# Patient Record
Sex: FEMALE | Race: WHITE | NOT HISPANIC OR LATINO
[De-identification: names, ages, dates, MRNs, and addresses within clinical notes are randomized per-mention and may not be internally consistent; named-entity substitution may affect disease eponyms.]

---

## 2022-07-30 ENCOUNTER — TELEPHONE ENCOUNTER (OUTPATIENT)
Age: 62
End: 2022-07-30

## 2022-07-30 RX ORDER — PANTOPRAZOLE SODIUM 40 MG/1
1 TABLET DR TABLET, DELAYED RELEASE ORAL
Qty: 0 | Refills: 16 | OUTPATIENT
Start: 2013-07-26 | End: 2016-03-15

## 2022-07-30 RX ORDER — LIDOCAINE HYDROCHLORIDE 30 MG/G
1 (ONE) CREAM TOPICAL
Qty: 0 | Refills: 2 | OUTPATIENT
Start: 2011-04-20 | End: 2011-04-30

## 2022-07-30 RX ORDER — FAMOTIDINE 10 MG
1 (ONE) TABLET ORAL AT BEDTIME
Qty: 0 | Refills: 16 | OUTPATIENT
Start: 2014-05-02 | End: 2016-03-15

## 2022-07-30 RX ORDER — ALUMINUM HYDROXIDE AND MAGNESIUM CARBONATE 95; 358 MG/15ML; MG/15ML
15ML LIQUID ORAL
Qty: 0 | Refills: 2 | OUTPATIENT
Start: 2014-05-02 | End: 2014-06-01

## 2022-07-30 RX ORDER — LORATADINE 5 MG
17 GRAMS POWDER TABLET,CHEWABLE ORAL
Qty: 0 | Refills: 16 | OUTPATIENT
Start: 2013-07-19 | End: 2014-05-02

## 2022-07-30 RX ORDER — HYDROCORTISONE ACETATE 25 MG/1
1 (ONE) SUPPOSITORY RECTAL
Qty: 0 | Refills: 3 | OUTPATIENT
Start: 2011-04-20 | End: 2013-07-19

## 2022-07-31 ENCOUNTER — TELEPHONE ENCOUNTER (OUTPATIENT)
Age: 62
End: 2022-07-31

## 2022-07-31 RX ORDER — FAMOTIDINE 10 MG
1 (ONE) TABLET ORAL AT BEDTIME
Qty: 0 | Refills: 16 | Status: ACTIVE | COMMUNITY
Start: 2014-05-02

## 2022-07-31 RX ORDER — LORATADINE 5 MG
17 GRAMS TABLET,CHEWABLE ORAL
Qty: 0 | Refills: 16 | Status: ACTIVE | COMMUNITY
Start: 2013-07-19

## 2022-07-31 RX ORDER — LIDOCAINE HYDROCHLORIDE 30 MG/G
1 (ONE) CREAM TOPICAL
Qty: 0 | Refills: 2 | Status: ACTIVE | COMMUNITY
Start: 2011-04-20

## 2022-07-31 RX ORDER — HYDROCORTISONE ACETATE 25 MG/1
1 (ONE) SUPPOSITORY RECTAL
Qty: 0 | Refills: 3 | Status: ACTIVE | COMMUNITY
Start: 2011-04-20

## 2022-07-31 RX ORDER — ALUMINUM HYDROXIDE AND MAGNESIUM CARBONATE 95; 358 MG/15ML; MG/15ML
15ML LIQUID ORAL
Qty: 0 | Refills: 2 | Status: ACTIVE | COMMUNITY
Start: 2014-05-02

## 2022-07-31 RX ORDER — PANTOPRAZOLE SODIUM 40 MG/1
1 TABLET, DELAYED RELEASE ORAL
Qty: 0 | Refills: 16 | Status: ACTIVE | COMMUNITY
Start: 2013-07-26

## 2023-05-26 ENCOUNTER — TELEPHONE ENCOUNTER (OUTPATIENT)
Dept: URBAN - METROPOLITAN AREA SURGERY CENTER 5 | Facility: SURGERY CENTER | Age: 63
End: 2023-05-26

## 2023-05-26 VITALS — HEIGHT: 63 IN | BODY MASS INDEX: 24.8 KG/M2 | WEIGHT: 140 LBS

## 2023-05-26 RX ORDER — AMLODIPINE BESYLATE 2.5 MG/1
1 TABLET TABLET ORAL ONCE A DAY
Status: ACTIVE | COMMUNITY

## 2023-05-26 RX ORDER — LIDOCAINE HYDROCHLORIDE 30 MG/G
1 (ONE) CREAM TOPICAL
Qty: 0 | Refills: 2 | Status: DISCONTINUED | COMMUNITY
Start: 2011-04-20

## 2023-05-26 RX ORDER — UBIQUINOL 100 MG
AS DIRECTED CAPSULE ORAL ONCE A DAY
Status: ACTIVE | COMMUNITY

## 2023-05-26 RX ORDER — ALUMINUM HYDROXIDE AND MAGNESIUM CARBONATE 95; 358 MG/15ML; MG/15ML
15ML LIQUID ORAL
Qty: 0 | Refills: 2 | Status: ACTIVE | COMMUNITY
Start: 2014-05-02

## 2023-05-26 RX ORDER — ZINC GLUCONATE 50 MG
1 TABLET TABLET ORAL ONCE A DAY
Status: ACTIVE | COMMUNITY

## 2023-05-26 RX ORDER — PANTOPRAZOLE SODIUM 40 MG/1
1 TABLET, DELAYED RELEASE ORAL
Qty: 0 | Refills: 16 | Status: DISCONTINUED | COMMUNITY
Start: 2013-07-26

## 2023-05-26 RX ORDER — DICLOFENAC SODIUM 10 MG/G
AS DIRECTED GEL TOPICAL
Status: ACTIVE | COMMUNITY

## 2023-05-26 RX ORDER — LORATADINE 5 MG
17 GRAMS TABLET,CHEWABLE ORAL
Qty: 0 | Refills: 16 | Status: DISCONTINUED | COMMUNITY
Start: 2013-07-19

## 2023-05-26 RX ORDER — HYDROCORTISONE ACETATE 25 MG/1
1 (ONE) SUPPOSITORY RECTAL
Qty: 0 | Refills: 3 | Status: DISCONTINUED | COMMUNITY
Start: 2011-04-20

## 2023-05-26 RX ORDER — UBIDECARENONE 30 MG
AS DIRECTED CAPSULE ORAL
Status: ACTIVE | COMMUNITY

## 2023-05-26 RX ORDER — FAMOTIDINE 10 MG
1 (ONE) TABLET TABLET ORAL AS NEEDED
Refills: 16 | Status: ACTIVE | COMMUNITY
Start: 2014-05-02

## 2023-06-02 ENCOUNTER — LAB OUTSIDE AN ENCOUNTER (OUTPATIENT)
Dept: URBAN - METROPOLITAN AREA SURGERY CENTER 5 | Facility: SURGERY CENTER | Age: 63
End: 2023-06-02

## 2023-08-11 ENCOUNTER — TELEPHONE ENCOUNTER (OUTPATIENT)
Dept: URBAN - METROPOLITAN AREA CLINIC 6 | Facility: CLINIC | Age: 63
End: 2023-08-11

## 2023-08-18 ENCOUNTER — CLAIMS CREATED FROM THE CLAIM WINDOW (OUTPATIENT)
Dept: URBAN - METROPOLITAN AREA CLINIC 4 | Facility: CLINIC | Age: 63
End: 2023-08-18
Payer: COMMERCIAL

## 2023-08-18 ENCOUNTER — OFFICE VISIT (OUTPATIENT)
Dept: URBAN - METROPOLITAN AREA SURGERY CENTER 5 | Facility: SURGERY CENTER | Age: 63
End: 2023-08-18
Payer: COMMERCIAL

## 2023-08-18 DIAGNOSIS — D12.4 POLYP OF DESCENDING COLON: ICD-10-CM

## 2023-08-18 DIAGNOSIS — Z12.11 ENCOUNTER FOR SCREENING FOR MALIGNANT NEOPLASM OF COLON: ICD-10-CM

## 2023-08-18 DIAGNOSIS — K64.1 SECOND DEGREE HEMORRHOIDS: ICD-10-CM

## 2023-08-18 DIAGNOSIS — K63.89 OTHER SPECIFIED DISEASES OF INTESTINE: ICD-10-CM

## 2023-08-18 DIAGNOSIS — K57.30 DIVERTICULOSIS OF LARGE INTESTINE WITHOUT PERFORATION OR ABSCESS WITHOUT BLEEDING: ICD-10-CM

## 2023-08-18 DIAGNOSIS — K63.5 POLYP OF ASCENDING COLON, UNSPECIFIED TYPE: ICD-10-CM

## 2023-08-18 DIAGNOSIS — D12.2 POLYP OF ASCENDING COLON: ICD-10-CM

## 2023-08-18 PROBLEM — 724538004: Status: ACTIVE | Noted: 2023-08-18

## 2023-08-18 PROCEDURE — 45385 COLONOSCOPY W/LESION REMOVAL: CPT | Performed by: INTERNAL MEDICINE

## 2023-08-18 PROCEDURE — 00811 ANES LWR INTST NDSC NOS: CPT | Performed by: NURSE ANESTHETIST, CERTIFIED REGISTERED

## 2023-08-18 PROCEDURE — 88305 TISSUE EXAM BY PATHOLOGIST: CPT | Performed by: PATHOLOGY

## 2024-04-12 ENCOUNTER — OV EP (OUTPATIENT)
Dept: URBAN - METROPOLITAN AREA CLINIC 7 | Facility: CLINIC | Age: 64
End: 2024-04-12
Payer: COMMERCIAL

## 2024-04-12 VITALS
SYSTOLIC BLOOD PRESSURE: 134 MMHG | HEIGHT: 63 IN | WEIGHT: 146 LBS | TEMPERATURE: 97.8 F | BODY MASS INDEX: 25.87 KG/M2 | DIASTOLIC BLOOD PRESSURE: 72 MMHG

## 2024-04-12 DIAGNOSIS — K59.09 INTERMITTENT CONSTIPATION: ICD-10-CM

## 2024-04-12 DIAGNOSIS — R11.0 NAUSEA: ICD-10-CM

## 2024-04-12 DIAGNOSIS — Z86.010 HX OF COLONIC POLYPS: ICD-10-CM

## 2024-04-12 DIAGNOSIS — K57.92 ACUTE DIVERTICULITIS: ICD-10-CM

## 2024-04-12 DIAGNOSIS — F41.9 ANXIETY: ICD-10-CM

## 2024-04-12 DIAGNOSIS — Z80.0 FAMILY HX OF COLON CANCER: ICD-10-CM

## 2024-04-12 DIAGNOSIS — K21.9 GERD WITHOUT ESOPHAGITIS: ICD-10-CM

## 2024-04-12 PROBLEM — 735593008: Status: ACTIVE | Noted: 2024-04-12

## 2024-04-12 PROBLEM — 14760008: Status: ACTIVE | Noted: 2024-04-12

## 2024-04-12 PROBLEM — 422587007: Status: ACTIVE | Noted: 2024-04-12

## 2024-04-12 PROBLEM — 428283002: Status: ACTIVE | Noted: 2024-04-12

## 2024-04-12 PROBLEM — 266435005: Status: ACTIVE | Noted: 2024-04-12

## 2024-04-12 PROBLEM — 312824007: Status: ACTIVE | Noted: 2024-04-12

## 2024-04-12 PROCEDURE — 99214 OFFICE O/P EST MOD 30 MIN: CPT | Performed by: INTERNAL MEDICINE

## 2024-04-12 RX ORDER — UBIDECARENONE 30 MG
AS DIRECTED CAPSULE ORAL
Status: ON HOLD | COMMUNITY

## 2024-04-12 RX ORDER — FAMOTIDINE 10 MG
1 (ONE) TABLET TABLET ORAL AS NEEDED
Refills: 16 | Status: ON HOLD | COMMUNITY
Start: 2014-05-02

## 2024-04-12 RX ORDER — AMLODIPINE BESYLATE 2.5 MG/1
1 TABLET TABLET ORAL ONCE A DAY
Status: ACTIVE | COMMUNITY

## 2024-04-12 RX ORDER — FAMOTIDINE 20 MG/1
TAKE 1 TABLET BY MOUTH DAILY TABLET, FILM COATED ORAL ONCE A DAY
Qty: 30 | Refills: 11

## 2024-04-12 RX ORDER — FAMOTIDINE 20 MG/1
TAKE 1 TABLET BY MOUTH DAILY TABLET, FILM COATED ORAL
Qty: 20 EACH | Refills: 0 | Status: ACTIVE | COMMUNITY

## 2024-04-12 RX ORDER — UBIQUINOL 100 MG
AS DIRECTED CAPSULE ORAL ONCE A DAY
Status: ACTIVE | COMMUNITY

## 2024-04-12 RX ORDER — ONDANSETRON 4 MG/1
DISSOLVE 1 TABLET ON THE TONGUE EVERY 8 HOURS FOR UP TO 9 DOSES AS NEEDED FOR NAUSEA TABLET, ORALLY DISINTEGRATING ORAL
Qty: 9 EACH | Refills: 0 | Status: ACTIVE | COMMUNITY

## 2024-04-12 RX ORDER — ONDANSETRON 4 MG/1
1 TABLE TABLET, ORALLY DISINTEGRATING ORAL
Qty: 90 | Refills: 1

## 2024-04-12 RX ORDER — ATORVASTATIN CALCIUM 20 MG/1
5 ML TABLET, FILM COATED ORAL ONCE A DAY
Status: ACTIVE | COMMUNITY

## 2024-04-12 RX ORDER — ZINC GLUCONATE 50 MG
1 TABLET TABLET ORAL ONCE A DAY
Status: ON HOLD | COMMUNITY

## 2024-04-12 RX ORDER — ALUMINUM HYDROXIDE AND MAGNESIUM CARBONATE 95; 358 MG/15ML; MG/15ML
15ML LIQUID ORAL
Qty: 0 | Refills: 2 | Status: ACTIVE | COMMUNITY
Start: 2014-05-02

## 2024-04-12 RX ORDER — DICLOFENAC SODIUM 10 MG/G
AS DIRECTED GEL TOPICAL
Status: ACTIVE | COMMUNITY

## 2024-04-12 NOTE — HPI-TODAY'S VISIT:
Patient is seen today after a prior  imaging study in ER with acute diverticulitis after presenting with abd pain and a change in bowel habits. Completed course antibiotic rx. Takes prn zofran for nausea Daily famotidine Sigmoid diverticulosis noted on colonoscopy 2023. No current gerd alarm sxs. No current abdominal  pain. No fevers or chills. No night sweats. No weight loss. No nausea or vomiting. No dark urine or acholic stools. No change in bowel habits . No travel,sick contacts or new medications. No family history IBD,CRC

## 2024-08-14 ENCOUNTER — TELEPHONE ENCOUNTER (OUTPATIENT)
Dept: URBAN - METROPOLITAN AREA CLINIC 7 | Facility: CLINIC | Age: 64
End: 2024-08-14

## 2024-08-14 RX ORDER — AMOXICILLIN AND CLAVULANATE POTASSIUM 500; 125 MG/1; 1/1
1 TABLET TABLET, FILM COATED ORAL
Qty: 30 TABLET | Refills: 0 | OUTPATIENT
Start: 2024-08-14 | End: 2024-08-24

## 2024-08-19 ENCOUNTER — TELEPHONE ENCOUNTER (OUTPATIENT)
Dept: URBAN - METROPOLITAN AREA CLINIC 7 | Facility: CLINIC | Age: 64
End: 2024-08-19

## 2024-08-20 ENCOUNTER — OFFICE VISIT (OUTPATIENT)
Dept: URBAN - METROPOLITAN AREA CLINIC 7 | Facility: CLINIC | Age: 64
End: 2024-08-20

## 2025-06-02 ENCOUNTER — ERX REFILL RESPONSE (OUTPATIENT)
Dept: URBAN - METROPOLITAN AREA CLINIC 7 | Facility: CLINIC | Age: 65
End: 2025-06-02

## 2025-06-02 RX ORDER — FAMOTIDINE 20 MG/1
TAKE 1 TABLET BY MOUTH DAILY FOR 20 DAYS TABLET, FILM COATED ORAL
Qty: 30 TABLET | Refills: 0

## 2025-06-10 ENCOUNTER — TELEPHONE ENCOUNTER (OUTPATIENT)
Dept: URBAN - METROPOLITAN AREA CLINIC 7 | Facility: CLINIC | Age: 65
End: 2025-06-10

## 2025-06-10 RX ORDER — AMOXICILLIN AND CLAVULANATE POTASSIUM 875; 125 MG/1; MG/1
1 TABLET TABLET, FILM COATED ORAL
Qty: 20 TABLET | Refills: 0 | OUTPATIENT
Start: 2025-06-10 | End: 2025-06-20

## 2025-06-26 ENCOUNTER — TELEPHONE ENCOUNTER (OUTPATIENT)
Dept: URBAN - METROPOLITAN AREA CLINIC 7 | Facility: CLINIC | Age: 65
End: 2025-06-26

## 2025-06-27 ENCOUNTER — LAB OUTSIDE AN ENCOUNTER (OUTPATIENT)
Dept: URBAN - METROPOLITAN AREA CLINIC 7 | Facility: CLINIC | Age: 65
End: 2025-06-27

## 2025-07-01 ENCOUNTER — ERX REFILL RESPONSE (OUTPATIENT)
Dept: URBAN - METROPOLITAN AREA CLINIC 7 | Facility: CLINIC | Age: 65
End: 2025-07-01

## 2025-07-01 RX ORDER — ONDANSETRON 4 MG/1
DISSOLVE 1 TABLET ON THE TONGUE THREE TIMES DAILY TABLET, ORALLY DISINTEGRATING ORAL
Qty: 90 TABLET | Refills: 0

## 2025-07-03 ENCOUNTER — LAB OUTSIDE AN ENCOUNTER (OUTPATIENT)
Dept: URBAN - METROPOLITAN AREA CLINIC 7 | Facility: CLINIC | Age: 65
End: 2025-07-03

## 2025-07-09 ENCOUNTER — TELEPHONE ENCOUNTER (OUTPATIENT)
Dept: URBAN - METROPOLITAN AREA CLINIC 7 | Facility: CLINIC | Age: 65
End: 2025-07-09